# Patient Record
Sex: MALE | Race: WHITE | Employment: FULL TIME | URBAN - METROPOLITAN AREA
[De-identification: names, ages, dates, MRNs, and addresses within clinical notes are randomized per-mention and may not be internally consistent; named-entity substitution may affect disease eponyms.]

---

## 2019-04-07 ENCOUNTER — APPOINTMENT (OUTPATIENT)
Dept: GENERAL RADIOLOGY | Age: 69
End: 2019-04-07
Attending: PHYSICIAN ASSISTANT
Payer: MEDICARE

## 2019-04-07 ENCOUNTER — APPOINTMENT (OUTPATIENT)
Dept: CT IMAGING | Age: 69
End: 2019-04-07
Attending: PHYSICIAN ASSISTANT
Payer: MEDICARE

## 2019-04-07 ENCOUNTER — HOSPITAL ENCOUNTER (EMERGENCY)
Age: 69
Discharge: HOME | End: 2019-04-07
Attending: EMERGENCY MEDICINE
Payer: MEDICARE

## 2019-04-07 VITALS
BODY MASS INDEX: 32.13 KG/M2 | SYSTOLIC BLOOD PRESSURE: 148 MMHG | HEIGHT: 69 IN | TEMPERATURE: 98 F | HEART RATE: 78 BPM | WEIGHT: 216.93 LBS | RESPIRATION RATE: 18 BRPM | DIASTOLIC BLOOD PRESSURE: 93 MMHG | OXYGEN SATURATION: 98 %
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
## 2019-04-07 DIAGNOSIS — S42.022A CLOSED DISPLACED FRACTURE OF SHAFT OF LEFT CLAVICLE, INITIAL ENCOUNTER: ICD-10-CM

## 2019-04-07 PROCEDURE — 74011250636 HC RX REV CODE- 250/636: Performed by: EMERGENCY MEDICINE

## 2019-04-07 PROCEDURE — 96372 THER/PROPH/DIAG INJ SC/IM: CPT

## 2019-04-07 PROCEDURE — 74011250636 HC RX REV CODE- 250/636

## 2019-04-07 PROCEDURE — 99283 EMERGENCY DEPT VISIT LOW MDM: CPT

## 2019-04-07 PROCEDURE — 70450 CT HEAD/BRAIN W/O DYE: CPT

## 2019-04-07 PROCEDURE — 73000 X-RAY EXAM OF COLLAR BONE: CPT

## 2019-04-07 PROCEDURE — 74011250637 HC RX REV CODE- 250/637: Performed by: PHYSICIAN ASSISTANT

## 2019-04-07 RX ORDER — MORPHINE SULFATE 10 MG/ML
8 INJECTION, SOLUTION INTRAMUSCULAR; INTRAVENOUS
Status: COMPLETED | OUTPATIENT
Start: 2019-04-07 | End: 2019-04-07

## 2019-04-07 RX ORDER — HYDROCODONE BITARTRATE AND ACETAMINOPHEN 5; 325 MG/1; MG/1
1 TABLET ORAL
Status: COMPLETED | OUTPATIENT
Start: 2019-04-07 | End: 2019-04-07

## 2019-04-07 RX ORDER — MORPHINE SULFATE 2 MG/ML
8 INJECTION, SOLUTION INTRAMUSCULAR; INTRAVENOUS
Status: DISCONTINUED | OUTPATIENT
Start: 2019-04-07 | End: 2019-04-07

## 2019-04-07 RX ORDER — MORPHINE SULFATE 10 MG/ML
INJECTION, SOLUTION INTRAMUSCULAR; INTRAVENOUS
Status: COMPLETED
Start: 2019-04-07 | End: 2019-04-07

## 2019-04-07 RX ORDER — OXYCODONE HYDROCHLORIDE 5 MG/1
5 TABLET ORAL
Qty: 12 TAB | Refills: 0 | Status: SHIPPED | OUTPATIENT
Start: 2019-04-07 | End: 2019-04-10

## 2019-04-07 RX ADMIN — MORPHINE SULFATE 8 MG: 10 INJECTION INTRAVENOUS at 16:36

## 2019-04-07 RX ADMIN — MORPHINE SULFATE 8 MG: 10 INJECTION, SOLUTION INTRAMUSCULAR; INTRAVENOUS at 16:36

## 2019-04-07 RX ADMIN — HYDROCODONE BITARTRATE AND ACETAMINOPHEN 1 TABLET: 5; 325 TABLET ORAL at 14:59

## 2019-04-07 NOTE — ED NOTES
ED provider at bedside to provide discharge paperwork. Vital signs stable. Pt in no apparent distress at this time. Mental status at baseline. To waiting room via wheelchair, discharge paperwork in hand. Accompanied by family.

## 2019-04-07 NOTE — ED PROVIDER NOTES
EMERGENCY DEPARTMENT HISTORY AND PHYSICAL EXAM 
 
 
Date: 4/7/2019 Patient Name: Clarke Antonio Patient Age and Sex: 76 y.o. male History of Presenting Illness Chief Complaint Patient presents with  Fall 3 foot fall from a ladder when the ladder sunk into the gournd: denies LOC History Provided By: Patient, wife HPI: Clarke Antonio is a 70-year-old male with no significant past medical history presenting with fall. Patient states that earlier today he had a 3 foot fall from a ladder when the ladder sunk into the ground. Pt denies any loss of consciousness or head injury, but wife states that when she found him he was staring up at the raya. Patient currently states that his left shoulder and left collarbone are hurting. It is a 10 out of 10. Patient denies any extremity pain otherwise, changes in vision, hearing loss. There are no other complaints, changes, or physical findings at this time. PCP: None No current facility-administered medications on file prior to encounter. No current outpatient medications on file prior to encounter. Past History Past Medical History: 
History reviewed. No pertinent past medical history. Past Surgical History: 
History reviewed. No pertinent surgical history. Family History: 
History reviewed. No pertinent family history. Social History: 
Social History Tobacco Use  Smoking status: Never Smoker Substance Use Topics  Alcohol use: No  
 Drug use: No  
 
 
Allergies: 
No Known Allergies Review of Systems Review of Systems Constitutional: Negative for chills and fever. Respiratory: Negative for cough and shortness of breath. Cardiovascular: Negative for chest pain. Gastrointestinal: Negative for constipation, diarrhea, nausea and vomiting. Musculoskeletal: Positive for arthralgias and joint swelling. Neurological: Negative for weakness and numbness. All other systems reviewed and are negative. Physical Exam  
Physical Exam  
Constitutional: He appears well-developed. No distress. HENT:  
Head: Normocephalic and atraumatic. Eyes: EOM are normal.  
Neck: Normal range of motion. Cardiovascular:  
Well perfused Pulmonary/Chest: Effort normal. No respiratory distress. Musculoskeletal: He exhibits tenderness and deformity. Obvious swelling and deformity over left collarbone with tenderness to palpation. Patient able to range shoulder but limited secondary to pain. Neurological: He is alert. Psychiatric: He has a normal mood and affect. Diagnostic Study Results Labs - No results found for this or any previous visit (from the past 12 hour(s)). Radiologic Studies -  
XR CLAVICLE LT Final Result IMPRESSION:   
  
Acute left clavicle fracture. CT HEAD WO CONT Final Result IMPRESSION: No acute intracranial process is identified. CT Results  (Last 48 hours) 04/07/19 1540  CT HEAD WO CONT Final result Impression:  IMPRESSION: No acute intracranial process is identified. Narrative:  EXAM: CT HEAD WO CONT Clinical history: Fall off of ladder INDICATION: Fall off ladder, repeating questions COMPARISON: None. CONTRAST: None. TECHNIQUE: Unenhanced CT of the head was performed using 5 mm images. Brain and  
bone windows were generated. CT dose reduction was achieved through use of a  
standardized protocol tailored for this examination and automatic exposure  
control for dose modulation. FINDINGS:  
Atherosclerotic calcifications in the vertebral vessels and in the carotid  
vessels. . There is no significant white matter disease. There is no intracranial  
hemorrhage, extra-axial collection, mass, mass effect or midline shift. The  
basilar cisterns are open. No acute infarct is identified. The bone windows demonstrate no abnormalities. The visualized portions of the paranasal sinuses  
and mastoid air cells are clear. CXR Results  (Last 48 hours) None Medical Decision Making I am the first provider for this patient. I reviewed the vital signs, available nursing notes, past medical history, past surgical history, family history and social history. Vital Signs-Reviewed the patient's vital signs. Patient Vitals for the past 12 hrs: 
 Temp Pulse Resp BP SpO2  
04/07/19 1445 98 °F (36.7 °C) 78 18 (!) 148/93 98 % Records Reviewed: Nursing Notes and Old Medical Records Provider Notes (Medical Decision Making):  
Patient presents after fall with head trauma and clavicle pain. Will get imaging to further evaluate for fracture vs. Dislocation vs. Contusion. Will treat with analgesics at this time and continue to monitor for changes in mentation. ED Course:  
Initial assessment performed. The patients presenting problems have been discussed, and they are in agreement with the care plan formulated and outlined with them. I have encouraged them to ask questions as they arise throughout their visit. Critical Care Time:  
0 Disposition: 
Discharge Note: The patient has been re-evaluated and is ready for discharge. Reviewed available results with patient. Counseled patient on diagnosis and care plan. Patient has expressed understanding, and all questions have been answered. Patient agrees with plan and agrees to follow up as recommended, or to return to the ED if their symptoms worsen. Discharge instructions have been provided and explained to the patient, along with reasons to return to the ED. PLAN: 
1. Discharge Medication List as of 4/7/2019  4:12 PM  
  
START taking these medications Details  
oxyCODONE IR (ROXICODONE) 5 mg immediate release tablet Take 1 Tab by mouth every six (6) hours as needed for Pain for up to 3 days.  Max Daily Amount: 20 mg., Print, Disp-12 Tab, R-0  
  
  
 
2. Follow-up Information Follow up With Specialties Details Why Contact Info Octavio Hernandes M.D. Page [address]ung [address]   [phone] 3. Return to ED if worse Diagnosis Clinical Impression: 1. Fall from ladder, initial encounter 2. Closed displaced fracture of shaft of left clavicle, initial encounter Attestations: 
 
Minor Murphy M.D. Please note that this dictation was completed with [employer], the computer voice recognition software. Quite often unanticipated grammatical, syntax, homophones, and other interpretive errors are inadvertently transcribed by the computer software. Please disregard these errors. Please excuse any errors that have escaped final proofreading. Thank you.

## 2022-06-28 RX ORDER — LISINOPRIL 10 MG/1
10 TABLET ORAL DAILY
COMMUNITY

## 2022-06-28 RX ORDER — CHOLECALCIFEROL TAB 125 MCG (5000 UNIT) 125 MCG
5000 TAB ORAL DAILY
COMMUNITY

## 2022-06-28 RX ORDER — BISMUTH SUBSALICYLATE 262 MG
1 TABLET,CHEWABLE ORAL DAILY
COMMUNITY

## 2022-06-28 RX ORDER — MULTIVIT WITH MINERALS/HERBS
1 TABLET ORAL DAILY
COMMUNITY

## 2022-06-29 ENCOUNTER — HOSPITAL ENCOUNTER (OUTPATIENT)
Age: 72
Setting detail: OUTPATIENT SURGERY
Discharge: HOME | End: 2022-06-29
Attending: INTERNAL MEDICINE | Admitting: INTERNAL MEDICINE
Payer: MEDICARE

## 2022-06-29 ENCOUNTER — ANESTHESIA (OUTPATIENT)
Dept: ENDOSCOPY | Age: 72
End: 2022-06-29
Payer: MEDICARE

## 2022-06-29 ENCOUNTER — ANESTHESIA EVENT (OUTPATIENT)
Dept: ENDOSCOPY | Age: 72
End: 2022-06-29
Payer: MEDICARE

## 2022-06-29 VITALS
RESPIRATION RATE: 20 BRPM | BODY MASS INDEX: 24.12 KG/M2 | DIASTOLIC BLOOD PRESSURE: 84 MMHG | HEART RATE: 84 BPM | SYSTOLIC BLOOD PRESSURE: 121 MMHG | TEMPERATURE: 97.6 F | WEIGHT: 182 LBS | HEIGHT: 73 IN | OXYGEN SATURATION: 97 %

## 2022-06-29 PROCEDURE — 74011000250 HC RX REV CODE- 250: Performed by: REGISTERED NURSE

## 2022-06-29 PROCEDURE — 76040000019: Performed by: INTERNAL MEDICINE

## 2022-06-29 PROCEDURE — 74011250637 HC RX REV CODE- 250/637: Performed by: INTERNAL MEDICINE

## 2022-06-29 PROCEDURE — 76060000031 HC ANESTHESIA FIRST 0.5 HR: Performed by: INTERNAL MEDICINE

## 2022-06-29 PROCEDURE — 74011250636 HC RX REV CODE- 250/636: Performed by: REGISTERED NURSE

## 2022-06-29 PROCEDURE — 88305 TISSUE EXAM BY PATHOLOGIST: CPT

## 2022-06-29 PROCEDURE — 2709999900 HC NON-CHARGEABLE SUPPLY: Performed by: INTERNAL MEDICINE

## 2022-06-29 PROCEDURE — 74011250636 HC RX REV CODE- 250/636: Performed by: INTERNAL MEDICINE

## 2022-06-29 RX ORDER — DEXTROMETHORPHAN/PSEUDOEPHED 2.5-7.5/.8
1.2 DROPS ORAL
Status: DISCONTINUED | OUTPATIENT
Start: 2022-06-29 | End: 2022-06-29 | Stop reason: HOSPADM

## 2022-06-29 RX ORDER — SODIUM CHLORIDE 0.9 % (FLUSH) 0.9 %
5-40 SYRINGE (ML) INJECTION AS NEEDED
Status: DISCONTINUED | OUTPATIENT
Start: 2022-06-29 | End: 2022-06-29 | Stop reason: HOSPADM

## 2022-06-29 RX ORDER — SODIUM CHLORIDE 0.9 % (FLUSH) 0.9 %
5-40 SYRINGE (ML) INJECTION EVERY 8 HOURS
Status: DISCONTINUED | OUTPATIENT
Start: 2022-06-29 | End: 2022-06-29 | Stop reason: HOSPADM

## 2022-06-29 RX ORDER — PROPOFOL 10 MG/ML
INJECTION, EMULSION INTRAVENOUS AS NEEDED
Status: DISCONTINUED | OUTPATIENT
Start: 2022-06-29 | End: 2022-06-29 | Stop reason: HOSPADM

## 2022-06-29 RX ORDER — SODIUM CHLORIDE 9 MG/ML
100 INJECTION, SOLUTION INTRAVENOUS CONTINUOUS
Status: DISCONTINUED | OUTPATIENT
Start: 2022-06-29 | End: 2022-06-29 | Stop reason: HOSPADM

## 2022-06-29 RX ORDER — NALOXONE HYDROCHLORIDE 0.4 MG/ML
0.4 INJECTION, SOLUTION INTRAMUSCULAR; INTRAVENOUS; SUBCUTANEOUS
Status: DISCONTINUED | OUTPATIENT
Start: 2022-06-29 | End: 2022-06-29 | Stop reason: HOSPADM

## 2022-06-29 RX ORDER — ATROPINE SULFATE 0.1 MG/ML
0.5 INJECTION INTRAVENOUS
Status: DISCONTINUED | OUTPATIENT
Start: 2022-06-29 | End: 2022-06-29 | Stop reason: HOSPADM

## 2022-06-29 RX ORDER — LIDOCAINE HYDROCHLORIDE 20 MG/ML
INJECTION, SOLUTION EPIDURAL; INFILTRATION; INTRACAUDAL; PERINEURAL AS NEEDED
Status: DISCONTINUED | OUTPATIENT
Start: 2022-06-29 | End: 2022-06-29 | Stop reason: HOSPADM

## 2022-06-29 RX ORDER — EPINEPHRINE 0.1 MG/ML
1 INJECTION INTRACARDIAC; INTRAVENOUS
Status: DISCONTINUED | OUTPATIENT
Start: 2022-06-29 | End: 2022-06-29 | Stop reason: HOSPADM

## 2022-06-29 RX ORDER — MIDAZOLAM HYDROCHLORIDE 1 MG/ML
.25-5 INJECTION, SOLUTION INTRAMUSCULAR; INTRAVENOUS
Status: DISCONTINUED | OUTPATIENT
Start: 2022-06-29 | End: 2022-06-29 | Stop reason: HOSPADM

## 2022-06-29 RX ORDER — FLUMAZENIL 0.1 MG/ML
0.2 INJECTION INTRAVENOUS
Status: DISCONTINUED | OUTPATIENT
Start: 2022-06-29 | End: 2022-06-29 | Stop reason: HOSPADM

## 2022-06-29 RX ADMIN — SODIUM CHLORIDE 100 ML/HR: 9 INJECTION, SOLUTION INTRAVENOUS at 07:35

## 2022-06-29 RX ADMIN — LIDOCAINE HYDROCHLORIDE 40 MG: 20 INJECTION, SOLUTION EPIDURAL; INFILTRATION; INTRACAUDAL; PERINEURAL at 07:54

## 2022-06-29 RX ADMIN — PROPOFOL 40 MG: 10 INJECTION, EMULSION INTRAVENOUS at 08:02

## 2022-06-29 RX ADMIN — PROPOFOL 80 MG: 10 INJECTION, EMULSION INTRAVENOUS at 07:54

## 2022-06-29 RX ADMIN — PROPOFOL 50 MG: 10 INJECTION, EMULSION INTRAVENOUS at 07:59

## 2022-06-29 RX ADMIN — PROPOFOL 30 MG: 10 INJECTION, EMULSION INTRAVENOUS at 08:05

## 2022-06-29 RX ADMIN — SIMETHICONE 80 MG: 20 SUSPENSION/ DROPS ORAL at 07:58

## 2022-06-29 NOTE — ANESTHESIA PREPROCEDURE EVALUATION
Relevant Problems   No relevant active problems       Anesthetic History   No history of anesthetic complications            Review of Systems / Medical History  Patient summary reviewed and pertinent labs reviewed    Pulmonary              Pertinent negatives: No COPD, asthma and smoker     Neuro/Psych           Pertinent negatives: No seizures and CVA   Cardiovascular    Hypertension                   GI/Hepatic/Renal                Endo/Other          Pertinent negatives: No diabetes and morbid obesity   Other Findings              Physical Exam    Airway  Mallampati: II  TM Distance: > 6 cm  Neck ROM: normal range of motion   Mouth opening: Normal     Cardiovascular  Regular rate and rhythm,  S1 and S2 normal,  no murmur, click, rub, or gallop  Rhythm: regular  Rate: normal         Dental  No notable dental hx       Pulmonary  Breath sounds clear to auscultation               Abdominal  GI exam deferred       Other Findings            Anesthetic Plan    ASA: 2  Anesthesia type: MAC          Induction: Intravenous  Anesthetic plan and risks discussed with: Patient

## 2022-06-29 NOTE — ROUTINE PROCESS
Kierra Burt  1950  [id number]    Situation:  Verbal report received from: Amor Ning  Procedure: Procedure(s):  COLONOSCOPY    Background:    Preoperative diagnosis: SCREENING  Postoperative diagnosis: polyp, hemorrhoids    :  Dr. Sherrell Andre  Assistant(s): Endoscopy Technician-1: Toby Erickson  Endoscopy RN-1: Clinton Ann; Erna Holley RN  Endoscopy RN-Relief: Anuja Mcwilliams RN    Specimens:   ID Type Source Tests Collected by Time Destination   1 : rectal polyp Preservative Rectum  Darren Vega MD 6/29/2022 0230 Pathology     H. Pylori  no    Assessment:  Intra-procedure medications       Anesthesia gave intra-procedure sedation and medications, see anesthesia flow sheet yes    Intravenous fluids: NS@ KVO     Vital signs stable yes    Abdominal assessment: round and soft yes    Recommendation:

## 2022-06-29 NOTE — H&P
Ewa Morrison MD  [facility], [address]  [phone]  [email or web address]    Gastroenterology Outpatient History and Physical    Patient: Harleenmamadou Benedict    Physician: Delicia Stokes MD    Vital Signs: Blood pressure (!) 157/98, pulse (!) 124, temperature 98.1 °F (36.7 °C), resp. rate 16, height 6' 1\" (1.854 m), weight 82.6 kg (182 lb), SpO2 98 %. Allergies: No Known Allergies    Chief Complaint: Screening colonoscopy    History of Present Illness: Here for a screening colonoscopy. No prior colonoscopy . Currently has no GI symptoms. No FH of colon cancer or polyps. History:  Past Medical History:   Diagnosis Date    Adverse effect of anesthesia     could not empty bladder    Hypertension       Past Surgical History:   Procedure Laterality Date    HX ORTHOPAEDIC      collar bone       Social History     Socioeconomic History    Marital status:    Tobacco Use    Smoking status: Never Smoker    Smokeless tobacco: Never Used   Vaping Use    Vaping Use: Never used   Substance and Sexual Activity    Alcohol use: No    Drug use: No      Family History   Problem Relation Age of Onset    Arthritis Mother     Heart Failure Mother     Hypertension Father     Other Sister         brain aneurysm    Cancer Brother         prostate    Prostate Cancer Brother     Parkinson's Disease Brother     There is no problem list on file for this patient. Medications:   Prior to Admission medications    Medication Sig Start Date End Date Taking? Authorizing Provider   lisinopriL (PRINIVIL, ZESTRIL) 10 mg tablet Take 10 mg by mouth daily. Yes Provider, Historical   vit A/vit C/vit E/zinc/copper (PRESERVISION AREDS PO) Take 1 Tablet by mouth two (2) times a day. Yes Provider, Historical   b complex vitamins tablet Take 1 Tablet by mouth daily.    Yes Provider, Historical   cholecalciferol (VITAMIN D3) (5000 Units/125 mcg) tab tablet Take 5,000 Units by mouth daily. Yes Provider, Historical   SELENIUM PO Take 1 Tablet by mouth daily. Yes Provider, Historical   multivitamin (ONE A DAY) tablet Take 1 Tablet by mouth daily. Yes Provider, Historical   docosahexaenoic acid/epa (FISH OIL PO) Take 1 Tablet by mouth daily. Yes Provider, Historical   COLLAGEN by Does Not Apply route daily.    Yes Provider, Historical       Physical Exam:     General: well developed, well nourished   HEENT: unremarkable   Heart: regular rhythm no mumur    Lungs: clear   Abdominal:  benign   Neurological: unremarkable   Extremities: no edema     Findings/Diagnosis: Screening colonoscopy    Plan of Care/Planned Procedure: Colonoscopy with monitored anesthesia care sedation    Signed:  Barry Nino MD 6/29/2022

## 2022-06-29 NOTE — PROGRESS NOTES
80  Endoscope was pre-cleaned at bedside immediately following procedure by [place]. 810  Received report from anesthesia. Assumed pt care. VSS.

## 2022-06-29 NOTE — PROCEDURES
[facility]                  Colonoscopy Operative Report    6/29/2022      Regulo Owusu  [id number]  1950    Procedure Type:   Colonoscopy --screening     Indications:    Screening colonoscopy     Pre-operative Diagnosis: see indication above    Post-operative Diagnosis:  See findings below    :  Mavis Menezes MD    Referring Provider: Maverick Zheng MD      Sedation:  MAC anesthesia Propofol    Pre-Procedural Exam:      Airway: clear,  No airway problems anticipated  Heart: RRR, without gallops or rubs  Lungs: clear bilaterally without wheezes, crackles, or rhonchi  Abdomen: soft, nontender, nondistended, bowel sounds present  Mental Status: awake, alert and oriented to person, place and time     Procedure Details:  After informed consent was obtained with all risks and benefits of procedure explained and preoperative exam completed, the patient was taken to the endoscopy suite and placed in the left lateral decubitus position. Upon sequential sedation as per above, a digital rectal exam was performed . The Olympus videocolonoscope  was inserted in the rectum and carefully advanced to the cecum, which was identified by the ileocecal valve and appendiceal orifice. The cecum was identified by the ileocecal valve and appendiceal orifice. The quality of preparation was good. The colonoscope was slowly withdrawn with careful evaluation between folds. Retroflexion in the rectum was completed demonstrating internal hemorrhoids. Findings:   Rectum: Grade 1 internal hemorrhoid(s);  6 mm polyp upper rectum, cold snared  Sigmoid: normal  Descending Colon: normal  Transverse Colon: normal  Ascending Colon: normal  Cecum: normal  Terminal Ileum: not intubated      Specimen Removed:  rectal polyp    Complications: None. EBL:  None. Impression:    hemorrhoids internal, Small in size  small rectal polyp removed    Recommendations: --Await pathology. , -Repeat colonoscopy in 5 years. High fiber diet. Resume normal medication(s). Discharge Disposition:  Home in the company of a  when able to ambulate. Yelena Langley MD    6/29/2022     ARNEL Hurtado MD  [facility], [address] [facility] [address]  [phone]  [email or web address]

## 2022-06-29 NOTE — DISCHARGE INSTRUCTIONS
Coby Witt MD  [facility], [address]  [place], [address] [facility]  [phone]  [email or web address]    Angelica Galvan  [id number]  1950    COLON DISCHARGE INSTRUCTIONS  Discomfort:  Redness at IV site- apply warm compress to area; if redness or soreness persist- contact your physician  There may be a slight amount of blood passed from the rectum  Gaseous discomfort- walking, belching will help relieve any discomfort  You may not operate a vehicle for 12 hours  You may not engage in an occupation involving machinery or appliances for rest of today  You may not drink alcoholic beverages for at least 12 hours  Avoid making any critical decisions for at least 24 hour  DIET:   High fiber diet. - however -  remember your colon is empty and a heavy meal will produce gas. Avoid these foods:  vegetables, fried / greasy foods, carbonated drinks for today      ACTIVITY:  You may resume your normal daily activities it is recommended that you spend the remainder of the day resting -  avoid any strenuous activity. CALL M.D. ANY SIGN OF:   Increasing pain, nausea, vomiting  Abdominal distension (swelling)  New increased bleeding (oral or rectal)  Fever (chills)  Pain in chest area  Bloody discharge from nose or mouth  Shortness of breath     COLONOSCOPY FINDINGS:  Your colonoscopy showed: one rectal polyp which was removed and small internal hemorrhoids. Follow-up Instructions:   Call Dr. Coby Witt if any questions or problems. Telephone # [phone]  Dr. Rai [place] office will notify you of the biopsy results within 7 to 10 days. Should have a repeat colonoscopy in 5 years.

## 2022-06-29 NOTE — ANESTHESIA POSTPROCEDURE EVALUATION
Procedure(s):  COLONOSCOPY. general    Anesthesia Post Evaluation      Multimodal analgesia: multimodal analgesia used between 6 hours prior to anesthesia start to PACU discharge  Patient location during evaluation: PACU  Patient participation: complete - patient participated  Level of consciousness: awake and alert  Pain management: adequate  Airway patency: patent  Anesthetic complications: no  Cardiovascular status: acceptable, hemodynamically stable and blood pressure returned to baseline  Respiratory status: acceptable and room air  Hydration status: euvolemic  Post anesthesia nausea and vomiting:  none  Final Post Anesthesia Temperature Assessment:  Normothermia (36.0-37.5 degrees C)      INITIAL Post-op Vital signs:   Vitals Value Taken Time   /84 06/29/22 0821   Temp     Pulse 88 06/29/22 0822   Resp 20 06/29/22 0822   SpO2 97 % 06/29/22 0822   Vitals shown include unvalidated device data.

## (undated) DEVICE — Z DISCONTINUED PER MEDLINE LINE GAS SAMPLING O2/CO2 LNG AD 13 FT NSL W/ TBNG FILTERLINE

## (undated) DEVICE — 1200 GUARD II KIT W/5MM TUBE W/O VAC TUBE: Brand: GUARDIAN

## (undated) DEVICE — SET ADMIN 16ML TBNG L100IN 2 Y INJ SITE IV PIGGY BK DISP

## (undated) DEVICE — TOWEL 4 PLY TISS 19X30 SUE WHT

## (undated) DEVICE — Device

## (undated) DEVICE — NEONATAL-ADULT SPO2 SENSOR: Brand: NELLCOR

## (undated) DEVICE — HYPODERMIC SAFETY NEEDLE: Brand: MAGELLAN

## (undated) DEVICE — CATH IV AUTOGRD BC PNK 20GA 25 -- INSYTE

## (undated) DEVICE — ELECTRODE,RADIOTRANSLUCENT,FOAM,5PK: Brand: MEDLINE

## (undated) DEVICE — SOLIDIFIER FLD 2OZ 1500CC N DISINF IN BTL DISP SAFESORB

## (undated) DEVICE — BASIN EMSIS 16OZ GRAPHITE PLAS KID SHP MOLD GRAD FOR ORAL

## (undated) DEVICE — SYR 3ML LL TIP 1/10ML GRAD --

## (undated) DEVICE — SYR 10ML LUER LOK 1/5ML GRAD --